# Patient Record
Sex: MALE | Race: OTHER | NOT HISPANIC OR LATINO | URBAN - METROPOLITAN AREA
[De-identification: names, ages, dates, MRNs, and addresses within clinical notes are randomized per-mention and may not be internally consistent; named-entity substitution may affect disease eponyms.]

---

## 2020-04-09 NOTE — PATIENT DISCUSSION
REFER TO RETINAL SPECIALIST, . PT HAS A COMPLETE RETINAL DETACHMENT WITH A LARGE HOLE, MAC OFF.  PT IS TO SEE HIM TOMORROW MORNING IN [place]

## 2020-04-24 NOTE — PATIENT DISCUSSION
POST-OP DAY 1 EXAM: S/P PPV/C3F8 OD. Doing well today. Retina attached. IOP within acceptable limits. Start eyedrops in the surgical eye as instructed: PRESCRIBED BESIVANCE 3/X DAY OD UNTIL BOTTLE EMPTY;  LOTEMAX SM 6X/DAY OD UNTIL NEXT VISIT. (SAMPLES GIVEN). Take tylenol or ibuprofen for any mild eye pain or pressure. Retinal detachment and endophthalmitis precautions reviewed. Instructed to call immediately for worsening vision, eye pain, or eye discharge.

## 2020-04-24 NOTE — PATIENT DISCUSSION
Continue: Lotemax SM (loteprednol etabonate): drops,gel: 0.38% 1 ml every three hours as directed into right eye

## 2020-04-24 NOTE — PATIENT DISCUSSION
LEFT SIDE  DOWN POSITIONING FOR 2 WEEKS. KEEP GAS BRACELET IN PLACE UNTIL BUBBLE GONE.  AVOID AIR TRAVEL, SCUBA DIVING, OR TRAVEL AT [place] UNTIL GAS BUBBLE HAS RESOLVED.

## 2020-05-05 NOTE — PATIENT DISCUSSION
POST-OP WEEK 2 EXAM: S/P PPV/C3F8 OD; NEW LARGE HORSESHOE TEAR WITH RECURRENT RETINAL DETACHMENT. IOP within acceptable limits. Continue eyedrops in the surgical eye as instructed. BEGIN LOTEMAX SM TAPER: 2X/DAY FOR 1 WEEK THEN 1X/DAY FOR 1 WEEK, THEN STOP. Call immediately for worsening vision, eye pain, or eye discharge.

## 2020-05-05 NOTE — PATIENT DISCUSSION
COMPLEX RETINAL DETACHMENT, OD:  SCHEDULE COMPLEX RD REPAIR WITH SCLERAL BUCKLE / PPV / LASER / GAS VS. SILICONE OIL. [id number].

## 2020-05-05 NOTE — PATIENT DISCUSSION
UPRIGHT WHILE AWAKE, CONTINUE TO AVOID SLEEPING FLAT BACK. KEEP GAS BRACELET IN PLACE UNTIL BUBBLE GONE. AVOID AIR TRAVEL, SCUBA DIVING, OR TRAVEL AT [place] UNTIL GAS BUBBLE HAS RESOLVED.

## 2020-06-05 NOTE — PATIENT DISCUSSION
POSTOP EYEDROPS:  LOTEMAX 6X/DAY OD (APPROX Q3 HR). SAMPLES GIVEN.    PRESCRIBED RX FOR CIPRO 4X/DAY OD.

## 2020-06-05 NOTE — PATIENT DISCUSSION
POST-OP DAY 1 EXAM S/P PPV/SILICONE OIL, OD: Doing well today. Retina attached. IOP within acceptable limits. Start eyedrops in the surgical eye as instructed. Take tylenol or ibuprofen for any mild eye pain or pressure. Retinal detachment and endophthalmitis precautions reviewed. Instructed to call immediately for worsening vision, eye pain, or eye discharge.

## 2020-06-10 NOTE — PATIENT DISCUSSION
FACE DOWN OR LEFT SIDE DOWN POSITIONING. REVIEWED PVD PRECAUTIONS WITH PATIENT AND ADVISED TO CALL IF EXPERIENCES NEW FLASHES OF LIGHT, INCREASE IN FLOATERS, OR DECREASE VISION IN EITHER EYE.

## 2020-06-10 NOTE — PATIENT DISCUSSION
POST-OP DAY 6 EXAM S/P PPV/SILICONE OIL, OD: Doing well today. Retina attached. IOP within acceptable limits. CONTINUE eyedrops in the surgical eye as instructed. START DUREZOL 4X/DAY WHEN FINISHED WITH LOTEMAX SAMPLE. Take tylenol or ibuprofen for any mild eye pain or pressure. USE ICE PACK FOR FIVE MINUTES PRN FOR EYELID SWELLING. Retinal detachment and endophthalmitis precautions reviewed. Instructed to call immediately for worsening vision, eye pain, or eye discharge.

## 2020-06-16 NOTE — PATIENT DISCUSSION
Stopped Today: Besivance (besifloxacin): drops,suspension: 0.6% 1 drop three times a day into right eye

## 2020-06-19 NOTE — PATIENT DISCUSSION
CONTINUE FACE DOWN OR LEFT SIDE DOWN POSITIONING FOR 1 WEEK. REVIEWED PVD PRECAUTIONS WITH PATIENT AND ADVISED TO CALL IF EXPERIENCES NEW FLASHES OF LIGHT, INCREASE IN FLOATERS, OR DECREASE VISION IN EITHER EYE.

## 2020-06-19 NOTE — PATIENT DISCUSSION
POST-OP WEEK 2 EXAM:  S/P PPV/SILICONE OIL OD. Doing well today. Retina attached. IOP decreasing with steroid taper. CONTINUE DUREZOL TAPER 3X/DAY FOR 1 WK, THEN 2X/DAY FOR 1 WK, THEN 1X/DAY UNTIL NEXT VISIT. Take tylenol or ibuprofen for any mild eye pain or pressure. Retinal detachment and endophthalmitis precautions reviewed. Instructed to call immediately for worsening vision, eye pain, or eye discharge.

## 2020-06-26 NOTE — PATIENT DISCUSSION
CLEARED TO BE UPRIGHT WHILE AWAKE. CONTINUE TO SLEEP LEFT SIDE DOWN ONLY. CLEARED TO RETURN TO WORK, AVOID HIGH-IMPACT ACTIVITY OR HEAVY LIFTING/STRAINING ACTIVITIES.

## 2020-06-26 NOTE — PATIENT DISCUSSION
POST-OP WEEK 3 EXAM: S/P PPV/SILICONE OIL OD. Doing well today. Retina attached. IOP decreasing with steroid taper. CONTINUE DUREZOL TAPER 2X/DAY FOR 1 WK, THEN 1X/DAY UNTIL SAMPLES ARE FINISHED. Take tylenol or ibuprofen for any mild eye pain or pressure. Retinal detachment and endophthalmitis precautions reviewed. Instructed to call immediately for worsening vision, eye pain, or eye discharge.

## 2022-07-05 RX ORDER — METHYLPHENIDATE HYDROCHLORIDE 20 MG/1
TABLET ORAL
COMMUNITY

## 2022-07-05 RX ORDER — ALBUTEROL SULFATE 90 UG/1
AEROSOL, METERED RESPIRATORY (INHALATION)
COMMUNITY

## 2022-07-05 RX ORDER — PROTRIPTYLINE HYDROCHLORIDE 5 MG/1
TABLET, FILM COATED ORAL
COMMUNITY

## 2022-07-05 RX ORDER — TIZANIDINE 4 MG/1
TABLET ORAL
COMMUNITY
Start: 2022-03-31

## 2022-07-05 RX ORDER — ARMODAFINIL 150 MG/1
TABLET ORAL
COMMUNITY

## 2022-07-05 RX ORDER — BENAZEPRIL HYDROCHLORIDE 20 MG/1
TABLET ORAL
COMMUNITY

## 2022-07-05 RX ORDER — TESTOSTERONE GEL, 1% 10 MG/G
GEL TRANSDERMAL
COMMUNITY

## 2022-08-03 ENCOUNTER — NEW PATIENT (OUTPATIENT)
Dept: URBAN - METROPOLITAN AREA CLINIC 4 | Facility: CLINIC | Age: 42
End: 2022-08-03

## 2022-08-03 DIAGNOSIS — H47.11: ICD-10-CM

## 2022-08-03 PROCEDURE — 92004 COMPRE OPH EXAM NEW PT 1/>: CPT

## 2022-08-03 PROCEDURE — 92133 CPTRZD OPH DX IMG PST SGM ON: CPT

## 2022-08-03 ASSESSMENT — TONOMETRY
OS_IOP_MMHG: 27
OD_IOP_MMHG: 22

## 2022-08-03 ASSESSMENT — VISUAL ACUITY
OS_SC: 20/20-1
OD_SC: 20/20-2

## 2022-08-25 ENCOUNTER — ESTABLISHED PATIENT (OUTPATIENT)
Dept: URBAN - METROPOLITAN AREA CLINIC 4 | Facility: CLINIC | Age: 42
End: 2022-08-25

## 2022-08-25 DIAGNOSIS — H11.421: ICD-10-CM

## 2022-08-25 DIAGNOSIS — H02.413: ICD-10-CM

## 2022-08-25 PROCEDURE — 99213 OFFICE O/P EST LOW 20 MIN: CPT

## 2022-08-25 ASSESSMENT — VISUAL ACUITY
OS_SC: 20/20
OD_SC: 20/20

## 2022-08-25 ASSESSMENT — TONOMETRY
OS_IOP_MMHG: 15
OD_IOP_MMHG: 15

## 2022-12-01 ENCOUNTER — ESTABLISHED PATIENT (OUTPATIENT)
Dept: URBAN - METROPOLITAN AREA CLINIC 17 | Facility: CLINIC | Age: 42
End: 2022-12-01

## 2022-12-01 PROCEDURE — 92133 CPTRZD OPH DX IMG PST SGM ON: CPT

## 2022-12-01 PROCEDURE — 92014 COMPRE OPH EXAM EST PT 1/>: CPT

## 2022-12-01 ASSESSMENT — VISUAL ACUITY
OS_SC: 20/20
OD_SC: 20/20

## 2022-12-01 ASSESSMENT — TONOMETRY
OS_IOP_MMHG: 20
OD_IOP_MMHG: 19

## 2023-06-27 ENCOUNTER — ESTABLISHED PATIENT (OUTPATIENT)
Dept: URBAN - METROPOLITAN AREA CLINIC 4 | Facility: CLINIC | Age: 43
End: 2023-06-27

## 2023-06-27 DIAGNOSIS — H47.11: ICD-10-CM

## 2023-06-27 PROCEDURE — 92012 INTRM OPH EXAM EST PATIENT: CPT

## 2023-06-27 ASSESSMENT — VISUAL ACUITY
OD_SC: 20/30
OS_SC: 20/25

## 2023-06-27 ASSESSMENT — TONOMETRY
OD_IOP_MMHG: 25
OS_IOP_MMHG: 25

## 2023-12-21 ENCOUNTER — ESTABLISHED PATIENT (OUTPATIENT)
Facility: LOCATION | Age: 43
End: 2023-12-21

## 2023-12-21 DIAGNOSIS — H47.11: ICD-10-CM

## 2023-12-21 PROCEDURE — 92014 COMPRE OPH EXAM EST PT 1/>: CPT

## 2023-12-21 PROCEDURE — 92133 CPTRZD OPH DX IMG PST SGM ON: CPT

## 2023-12-21 ASSESSMENT — VISUAL ACUITY
OS_SC: 20/20
OD_SC: 20/20

## 2023-12-21 ASSESSMENT — TONOMETRY
OD_IOP_MMHG: 22
OS_IOP_MMHG: 20

## 2024-01-12 ENCOUNTER — ESTABLISHED PATIENT (OUTPATIENT)
Facility: LOCATION | Age: 44
End: 2024-01-12

## 2024-01-12 DIAGNOSIS — H57.813: ICD-10-CM

## 2024-01-12 PROCEDURE — 92014 COMPRE OPH EXAM EST PT 1/>: CPT

## 2024-01-12 ASSESSMENT — TONOMETRY
OD_IOP_MMHG: 16
OS_IOP_MMHG: 16

## 2024-01-12 ASSESSMENT — VISUAL ACUITY
OS_SC: 20/25
OD_SC: 20/25

## 2024-08-28 ENCOUNTER — COMPREHENSIVE EXAM (OUTPATIENT)
Dept: URBAN - METROPOLITAN AREA CLINIC 18 | Facility: CLINIC | Age: 44
End: 2024-08-28

## 2024-08-28 DIAGNOSIS — H43.823: ICD-10-CM

## 2024-08-28 PROCEDURE — 92014 COMPRE OPH EXAM EST PT 1/>: CPT

## 2024-08-28 PROCEDURE — 92134 CPTRZ OPH DX IMG PST SGM RTA: CPT

## 2024-08-28 PROCEDURE — 92201 OPSCPY EXTND RTA DRAW UNI/BI: CPT | Mod: NC

## 2024-08-28 ASSESSMENT — TONOMETRY
OS_IOP_MMHG: 14
OD_IOP_MMHG: 13

## 2024-08-28 ASSESSMENT — VISUAL ACUITY
OD_SC: 20/20-1
OS_SC: 20/20

## 2025-02-25 ENCOUNTER — COMPREHENSIVE EXAM (OUTPATIENT)
Age: 45
End: 2025-02-25

## 2025-02-25 DIAGNOSIS — H43.823: ICD-10-CM

## 2025-02-25 PROCEDURE — 92134 CPTRZ OPH DX IMG PST SGM RTA: CPT

## 2025-02-25 PROCEDURE — 92201 OPSCPY EXTND RTA DRAW UNI/BI: CPT

## 2025-02-25 PROCEDURE — 92014 COMPRE OPH EXAM EST PT 1/>: CPT
